# Patient Record
Sex: FEMALE | RURAL
[De-identification: names, ages, dates, MRNs, and addresses within clinical notes are randomized per-mention and may not be internally consistent; named-entity substitution may affect disease eponyms.]

---

## 2021-01-20 ENCOUNTER — APPOINTMENT (OUTPATIENT)
Dept: RURAL CLINIC 10 | Age: 81
Setting detail: DERMATOLOGY
End: 2021-01-23

## 2021-01-20 DIAGNOSIS — Z85.820 PERSONAL HISTORY OF MALIGNANT MELANOMA OF SKIN: ICD-10-CM

## 2021-01-20 DIAGNOSIS — L65.9 NONSCARRING HAIR LOSS, UNSPECIFIED: ICD-10-CM

## 2021-01-20 PROCEDURE — OTHER PRESCRIPTION MEDICATION MANAGEMENT: OTHER

## 2021-01-20 PROCEDURE — 36415 COLL VENOUS BLD VENIPUNCTURE: CPT

## 2021-01-20 PROCEDURE — OTHER VENIPUNCTURE: OTHER

## 2021-01-20 PROCEDURE — OTHER COUNSELING: OTHER

## 2021-01-20 PROCEDURE — OTHER RECORDS REQUESTED: OTHER

## 2021-01-20 PROCEDURE — OTHER ORDER TESTS: OTHER

## 2021-01-20 PROCEDURE — OTHER OBSERVATION: OTHER

## 2021-01-20 PROCEDURE — 99204 OFFICE O/P NEW MOD 45 MIN: CPT | Mod: 25

## 2021-01-20 ASSESSMENT — LOCATION DETAILED DESCRIPTION DERM
LOCATION DETAILED: RIGHT ANTECUBITAL SKIN
LOCATION DETAILED: LEFT MEDIAL FRONTAL SCALP
LOCATION DETAILED: POSTERIOR MID-PARIETAL SCALP
LOCATION DETAILED: LEFT MEDIAL 5TH TOE

## 2021-01-20 ASSESSMENT — LOCATION SIMPLE DESCRIPTION DERM
LOCATION SIMPLE: RIGHT UPPER ARM
LOCATION SIMPLE: POSTERIOR SCALP
LOCATION SIMPLE: LEFT 5TH TOE
LOCATION SIMPLE: LEFT SCALP

## 2021-01-20 ASSESSMENT — LOCATION ZONE DERM
LOCATION ZONE: SCALP
LOCATION ZONE: TOE
LOCATION ZONE: ARM

## 2021-01-20 NOTE — PROCEDURE: RECORDS REQUESTED
Providers To Request Records From: Dr. Paige in [place] Providers To Request Records From: Dr. Paige in [place]

## 2021-01-20 NOTE — PROCEDURE: VENIPUNCTURE
Number Of Tubes Drawn: 2
Detail Level: Zone
Bill For Individual Tests Below?: no
Venipuncture Paragraph: An alcohol pad was applied to the venipuncture site. Venipuncture was performed using a butterfly needle. Pressure and a bandaid was applied to the site. No complications were noted.

## 2021-01-20 NOTE — HPI: HAIR LOSS
Additional History: Hx of melanoma 4 years ago, removed in [place] by dr. Jimenez. Additional History: Hx of melanoma 4 years ago, removed in [place] by dr. Jimenez.

## 2021-01-20 NOTE — PROCEDURE: PRESCRIPTION MEDICATION MANAGEMENT
Initiate Treatment: Rogaine BID\\nVitamin Vivascal
Render In Strict Bullet Format?: No
Detail Level: Zone

## 2021-01-25 ENCOUNTER — APPOINTMENT (OUTPATIENT)
Dept: RURAL CLINIC 10 | Age: 81
Setting detail: DERMATOLOGY
End: 2021-01-25

## 2021-01-25 DIAGNOSIS — L65.9 NONSCARRING HAIR LOSS, UNSPECIFIED: ICD-10-CM

## 2021-01-25 PROCEDURE — OTHER VENIPUNCTURE: OTHER

## 2021-01-25 PROCEDURE — 36415 COLL VENOUS BLD VENIPUNCTURE: CPT

## 2021-01-25 PROCEDURE — OTHER ORDER TESTS: OTHER

## 2021-01-25 ASSESSMENT — LOCATION ZONE DERM: LOCATION ZONE: ARM

## 2021-01-25 ASSESSMENT — LOCATION SIMPLE DESCRIPTION DERM: LOCATION SIMPLE: RIGHT UPPER ARM

## 2021-01-25 ASSESSMENT — LOCATION DETAILED DESCRIPTION DERM: LOCATION DETAILED: RIGHT ANTECUBITAL SKIN

## 2021-01-25 NOTE — PROCEDURE: VENIPUNCTURE
Detail Level: Zone
Number Of Tubes Drawn: 2
Venipuncture Paragraph: An alcohol pad was applied to the venipuncture site. Venipuncture was performed using a butterfly needle. Pressure and a bandaid was applied to the site. No complications were noted.
Bill For Individual Tests Below?: no
